# Patient Record
Sex: FEMALE | Race: WHITE | Employment: FULL TIME | URBAN - METROPOLITAN AREA
[De-identification: names, ages, dates, MRNs, and addresses within clinical notes are randomized per-mention and may not be internally consistent; named-entity substitution may affect disease eponyms.]

---

## 2022-09-23 PROBLEM — Z98.891 HISTORY OF CESAREAN DELIVERY: Status: ACTIVE | Noted: 2022-09-23

## 2022-09-23 PROBLEM — O09.529 AMA (ADVANCED MATERNAL AGE) MULTIGRAVIDA 35+: Status: ACTIVE | Noted: 2022-09-23

## 2022-09-23 PROBLEM — Z34.90 PREGNANCY: Status: ACTIVE | Noted: 2022-09-23

## 2022-09-23 PROBLEM — O09.90 HIGH-RISK PREGNANCY: Status: ACTIVE | Noted: 2022-09-23

## 2022-10-26 ENCOUNTER — ROUTINE PRENATAL (OUTPATIENT)
Dept: OBGYN CLINIC | Age: 37
End: 2022-10-26
Payer: COMMERCIAL

## 2022-10-26 VITALS
BODY MASS INDEX: 26.11 KG/M2 | WEIGHT: 147.38 LBS | DIASTOLIC BLOOD PRESSURE: 75 MMHG | HEIGHT: 63 IN | SYSTOLIC BLOOD PRESSURE: 120 MMHG

## 2022-10-26 DIAGNOSIS — Z11.51 SCREENING FOR HUMAN PAPILLOMAVIRUS: ICD-10-CM

## 2022-10-26 DIAGNOSIS — Z34.83 PRENATAL CARE, SUBSEQUENT PREGNANCY, THIRD TRIMESTER: ICD-10-CM

## 2022-10-26 DIAGNOSIS — O09.523 MULTIGRAVIDA OF ADVANCED MATERNAL AGE IN THIRD TRIMESTER: ICD-10-CM

## 2022-10-26 DIAGNOSIS — Z3A.32 32 WEEKS GESTATION OF PREGNANCY: Primary | ICD-10-CM

## 2022-10-26 DIAGNOSIS — O09.93 HIGH-RISK PREGNANCY IN THIRD TRIMESTER: ICD-10-CM

## 2022-10-26 DIAGNOSIS — Z11.3 SCREENING FOR VENEREAL DISEASE: ICD-10-CM

## 2022-10-26 PROCEDURE — 0502F SUBSEQUENT PRENATAL CARE: CPT | Performed by: OBSTETRICS & GYNECOLOGY

## 2022-10-26 NOTE — PROGRESS NOTES
Ketones:NEG // SG:NEG //  Monda Slates //  Pro:NEG //  Nit:NEG // Leuk: NEG   Pt presents with no new ob complaints//OB PAP TODAY, GBS NEXT//Aubrey

## 2022-10-31 LAB
C TRACH RRNA CVX QL NAA+PROBE: NEGATIVE
CYTOLOGIST CVX/VAG CYTO: NORMAL
CYTOLOGY CVX/VAG DOC CYTO: NORMAL
CYTOLOGY CVX/VAG DOC THIN PREP: NORMAL
DX ICD CODE: NORMAL
LABCORP, 190119: NORMAL
Lab: NORMAL
N GONORRHOEA RRNA CVX QL NAA+PROBE: NEGATIVE
OTHER STN SPEC: NORMAL
STAT OF ADQ CVX/VAG CYTO-IMP: NORMAL
T VAGINALIS RRNA SPEC QL NAA+PROBE: NEGATIVE

## 2022-11-09 ENCOUNTER — ROUTINE PRENATAL (OUTPATIENT)
Dept: OBGYN CLINIC | Age: 37
End: 2022-11-09
Payer: COMMERCIAL

## 2022-11-09 VITALS
HEIGHT: 63 IN | DIASTOLIC BLOOD PRESSURE: 68 MMHG | BODY MASS INDEX: 26.47 KG/M2 | WEIGHT: 149.38 LBS | SYSTOLIC BLOOD PRESSURE: 107 MMHG

## 2022-11-09 DIAGNOSIS — O09.523 MULTIGRAVIDA OF ADVANCED MATERNAL AGE IN THIRD TRIMESTER: ICD-10-CM

## 2022-11-09 DIAGNOSIS — Z3A.34 34 WEEKS GESTATION OF PREGNANCY: Primary | ICD-10-CM

## 2022-11-09 DIAGNOSIS — O09.93 HIGH-RISK PREGNANCY IN THIRD TRIMESTER: ICD-10-CM

## 2022-11-09 DIAGNOSIS — K59.00 CONSTIPATION, UNSPECIFIED CONSTIPATION TYPE: ICD-10-CM

## 2022-11-09 PROCEDURE — 0502F SUBSEQUENT PRENATAL CARE: CPT | Performed by: OBSTETRICS & GYNECOLOGY

## 2022-11-09 NOTE — PROGRESS NOTES
Ketones:NEG // SG:NEG //  Steven Hunt //  Pro:NEG //  Nit:NEG // Leuk: NEG   Pt presents with complaints of fatigue//GBS NEXT//Aubrey

## 2022-11-16 ENCOUNTER — ROUTINE PRENATAL (OUTPATIENT)
Dept: OBGYN CLINIC | Age: 37
End: 2022-11-16
Payer: COMMERCIAL

## 2022-11-16 VITALS
WEIGHT: 151 LBS | DIASTOLIC BLOOD PRESSURE: 78 MMHG | HEIGHT: 63 IN | SYSTOLIC BLOOD PRESSURE: 115 MMHG | BODY MASS INDEX: 26.75 KG/M2

## 2022-11-16 DIAGNOSIS — Z3A.35 35 WEEKS GESTATION OF PREGNANCY: ICD-10-CM

## 2022-11-16 DIAGNOSIS — O09.523 MULTIGRAVIDA OF ADVANCED MATERNAL AGE IN THIRD TRIMESTER: ICD-10-CM

## 2022-11-16 DIAGNOSIS — O09.93 HIGH-RISK PREGNANCY IN THIRD TRIMESTER: ICD-10-CM

## 2022-11-16 DIAGNOSIS — Z36.85 SCREENING, ANTENATAL, FOR STREPTOCOCCUS B: Primary | ICD-10-CM

## 2022-11-16 PROCEDURE — 0502F SUBSEQUENT PRENATAL CARE: CPT | Performed by: OBSTETRICS & GYNECOLOGY

## 2022-11-16 NOTE — PROGRESS NOTES
Ketones:NEG // SG:NEG //  Naveen Ledesma //  Pro:NEG //  Nit:NEG // Leuk: NEG   Pt presents with no new ob complaints//GBS TODAY//Aubrey

## 2022-11-20 LAB — B-HEM STREP SPEC QL CULT: NEGATIVE

## 2022-11-22 ENCOUNTER — ROUTINE PRENATAL (OUTPATIENT)
Dept: OBGYN CLINIC | Age: 37
End: 2022-11-22
Payer: COMMERCIAL

## 2022-11-22 VITALS
SYSTOLIC BLOOD PRESSURE: 135 MMHG | HEIGHT: 63 IN | WEIGHT: 151.5 LBS | BODY MASS INDEX: 26.84 KG/M2 | DIASTOLIC BLOOD PRESSURE: 69 MMHG

## 2022-11-22 DIAGNOSIS — O09.93 HIGH-RISK PREGNANCY IN THIRD TRIMESTER: ICD-10-CM

## 2022-11-22 DIAGNOSIS — Z3A.36 36 WEEKS GESTATION OF PREGNANCY: Primary | ICD-10-CM

## 2022-11-22 DIAGNOSIS — O09.523 MULTIGRAVIDA OF ADVANCED MATERNAL AGE IN THIRD TRIMESTER: ICD-10-CM

## 2022-11-22 PROCEDURE — 0502F SUBSEQUENT PRENATAL CARE: CPT | Performed by: OBSTETRICS & GYNECOLOGY

## 2022-11-22 PROCEDURE — 59025 FETAL NON-STRESS TEST: CPT | Performed by: OBSTETRICS & GYNECOLOGY

## 2022-11-22 NOTE — PROGRESS NOTES
Ketones:NEG // SG:NEG //  Zelphia Fall //  Pro:NEG //  Nit:NEG // Leuk: NEG   Pt presents with complaints of increased juan recinos x 2 days//GBS NEG//MKeeley

## 2022-11-30 ENCOUNTER — ROUTINE PRENATAL (OUTPATIENT)
Dept: OBGYN CLINIC | Age: 37
End: 2022-11-30
Payer: COMMERCIAL

## 2022-11-30 VITALS
SYSTOLIC BLOOD PRESSURE: 111 MMHG | HEIGHT: 63 IN | DIASTOLIC BLOOD PRESSURE: 65 MMHG | BODY MASS INDEX: 27.51 KG/M2 | WEIGHT: 155.25 LBS

## 2022-11-30 DIAGNOSIS — O09.523 MULTIGRAVIDA OF ADVANCED MATERNAL AGE IN THIRD TRIMESTER: ICD-10-CM

## 2022-11-30 DIAGNOSIS — Z3A.37 37 WEEKS GESTATION OF PREGNANCY: Primary | ICD-10-CM

## 2022-11-30 DIAGNOSIS — O09.93 HIGH-RISK PREGNANCY IN THIRD TRIMESTER: ICD-10-CM

## 2022-11-30 PROCEDURE — 0502F SUBSEQUENT PRENATAL CARE: CPT | Performed by: OBSTETRICS & GYNECOLOGY

## 2022-11-30 PROCEDURE — 59025 FETAL NON-STRESS TEST: CPT | Performed by: OBSTETRICS & GYNECOLOGY

## 2022-11-30 NOTE — PROGRESS NOTES
Ketones:NEG // SG:NEG //  Maritza Streeter //  Pro:NEG //  Nit:NEG // Leuk: NEG   Pt presents with complaints of juan recinos contractions x 1 day//GBS NEG. Johny Kearns

## 2022-12-07 ENCOUNTER — ANESTHESIA EVENT (OUTPATIENT)
Dept: LABOR AND DELIVERY | Age: 37
End: 2022-12-07
Payer: COMMERCIAL

## 2022-12-07 ENCOUNTER — ANESTHESIA (OUTPATIENT)
Dept: LABOR AND DELIVERY | Age: 37
End: 2022-12-07
Payer: COMMERCIAL

## 2022-12-07 ENCOUNTER — HOSPITAL ENCOUNTER (INPATIENT)
Age: 37
LOS: 2 days | Discharge: HOME | End: 2022-12-09
Attending: OBSTETRICS & GYNECOLOGY | Admitting: OBSTETRICS & GYNECOLOGY
Payer: COMMERCIAL

## 2022-12-07 DIAGNOSIS — Z98.891 STATUS POST REPEAT LOW TRANSVERSE CESAREAN SECTION: Primary | ICD-10-CM

## 2022-12-07 PROBLEM — O09.529 AMA (ADVANCED MATERNAL AGE) MULTIGRAVIDA 35+: Status: RESOLVED | Noted: 2022-09-23 | Resolved: 2022-12-07

## 2022-12-07 PROBLEM — Z3A.38 38 WEEKS GESTATION OF PREGNANCY: Status: ACTIVE | Noted: 2022-12-07

## 2022-12-07 PROBLEM — O47.9 UTERINE CONTRACTIONS: Status: ACTIVE | Noted: 2022-12-07

## 2022-12-07 PROBLEM — O09.90 HIGH-RISK PREGNANCY: Status: RESOLVED | Noted: 2022-09-23 | Resolved: 2022-12-07

## 2022-12-07 PROBLEM — Z34.90 PREGNANCY: Status: RESOLVED | Noted: 2022-09-23 | Resolved: 2022-12-07

## 2022-12-07 PROBLEM — Z3A.38 38 WEEKS GESTATION OF PREGNANCY: Status: RESOLVED | Noted: 2022-12-07 | Resolved: 2022-12-07

## 2022-12-07 PROBLEM — R10.9 ABDOMINAL PAIN: Status: ACTIVE | Noted: 2022-12-07

## 2022-12-07 PROBLEM — O47.9 UTERINE CONTRACTIONS: Status: RESOLVED | Noted: 2022-12-07 | Resolved: 2022-12-07

## 2022-12-07 LAB
ABO + RH BLD: NORMAL
AMPHET UR QL SCN: NEGATIVE
APPEARANCE UR: CLEAR
BACTERIA URNS QL MICRO: NEGATIVE /HPF
BARBITURATES UR QL SCN: NEGATIVE
BASOPHILS # BLD: 0 K/UL (ref 0–0.1)
BASOPHILS NFR BLD: 0 % (ref 0–1)
BENZODIAZ UR QL: NEGATIVE
BILIRUB UR QL: NEGATIVE
BLOOD GROUP ANTIBODIES SERPL: NEGATIVE
CANNABINOIDS UR QL SCN: NEGATIVE
COCAINE UR QL SCN: NEGATIVE
COLOR UR: ABNORMAL
DIFFERENTIAL METHOD BLD: ABNORMAL
DRUG SCRN COMMENT,DRGCM: NORMAL
EOSINOPHIL # BLD: 0 K/UL (ref 0–0.4)
EOSINOPHIL NFR BLD: 0 % (ref 0–7)
ERYTHROCYTE [DISTWIDTH] IN BLOOD BY AUTOMATED COUNT: 13.2 % (ref 11.5–14.5)
GLUCOSE UR STRIP.AUTO-MCNC: NEGATIVE MG/DL
HCT VFR BLD AUTO: 32.1 % (ref 35–47)
HGB BLD-MCNC: 10.5 G/DL (ref 11.5–16)
HGB UR QL STRIP: ABNORMAL
IMM GRANULOCYTES # BLD AUTO: 0.1 K/UL (ref 0–0.04)
IMM GRANULOCYTES NFR BLD AUTO: 1 % (ref 0–0.5)
KETONES UR QL STRIP.AUTO: NEGATIVE MG/DL
LEUKOCYTE ESTERASE UR QL STRIP.AUTO: NEGATIVE
LYMPHOCYTES # BLD: 1.5 K/UL (ref 0.8–3.5)
LYMPHOCYTES NFR BLD: 14 % (ref 12–49)
MCH RBC QN AUTO: 27.7 PG (ref 26–34)
MCHC RBC AUTO-ENTMCNC: 32.7 G/DL (ref 30–36.5)
MCV RBC AUTO: 84.7 FL (ref 80–99)
METHADONE UR QL: NEGATIVE
MONOCYTES # BLD: 0.6 K/UL (ref 0–1)
MONOCYTES NFR BLD: 5 % (ref 5–13)
MUCOUS THREADS URNS QL MICRO: ABNORMAL /LPF
NEUTS SEG # BLD: 8.4 K/UL (ref 1.8–8)
NEUTS SEG NFR BLD: 80 % (ref 32–75)
NITRITE UR QL STRIP.AUTO: NEGATIVE
NRBC # BLD: 0 K/UL (ref 0–0.01)
NRBC BLD-RTO: 0 PER 100 WBC
OPIATES UR QL: NEGATIVE
PCP UR QL: NEGATIVE
PH UR STRIP: 6 [PH]
PLATELET # BLD AUTO: 242 K/UL (ref 150–400)
PMV BLD AUTO: 10.1 FL (ref 8.9–12.9)
PROT UR STRIP-MCNC: NEGATIVE MG/DL
RBC # BLD AUTO: 3.79 M/UL (ref 3.8–5.2)
RBC #/AREA URNS HPF: ABNORMAL /HPF (ref 0–5)
SP GR UR REFRACTOMETRY: 1.01 (ref 1–1.03)
SPECIMEN EXP DATE BLD: NORMAL
UROBILINOGEN UR QL STRIP.AUTO: 0.1 EU/DL (ref 0.2–1)
WBC # BLD AUTO: 10.5 K/UL (ref 3.6–11)
WBC URNS QL MICRO: ABNORMAL /HPF (ref 0–4)

## 2022-12-07 PROCEDURE — 76060000033 HC ANESTHESIA 1 TO 1.5 HR: Performed by: OBSTETRICS & GYNECOLOGY

## 2022-12-07 PROCEDURE — 0UB50ZZ EXCISION OF RIGHT FALLOPIAN TUBE, OPEN APPROACH: ICD-10-PCS | Performed by: OBSTETRICS & GYNECOLOGY

## 2022-12-07 PROCEDURE — 86900 BLOOD TYPING SEROLOGIC ABO: CPT

## 2022-12-07 PROCEDURE — 77030007866 HC KT SPN ANES BBMI -B: Performed by: ANESTHESIOLOGY

## 2022-12-07 PROCEDURE — 75410000002 HC LABOR FEE PER 1 HR

## 2022-12-07 PROCEDURE — 99284 EMERGENCY DEPT VISIT MOD MDM: CPT

## 2022-12-07 PROCEDURE — 85025 COMPLETE CBC W/AUTO DIFF WBC: CPT

## 2022-12-07 PROCEDURE — 76010000392 HC C SECN EA ADDL 0.5 HR: Performed by: OBSTETRICS & GYNECOLOGY

## 2022-12-07 PROCEDURE — 88305 TISSUE EXAM BY PATHOLOGIST: CPT

## 2022-12-07 PROCEDURE — 76060000078 HC EPIDURAL ANESTHESIA: Performed by: OBSTETRICS & GYNECOLOGY

## 2022-12-07 PROCEDURE — 81001 URINALYSIS AUTO W/SCOPE: CPT

## 2022-12-07 PROCEDURE — 74011250636 HC RX REV CODE- 250/636: Performed by: NURSE ANESTHETIST, CERTIFIED REGISTERED

## 2022-12-07 PROCEDURE — 99285 EMERGENCY DEPT VISIT HI MDM: CPT

## 2022-12-07 PROCEDURE — 74011250636 HC RX REV CODE- 250/636: Performed by: OBSTETRICS & GYNECOLOGY

## 2022-12-07 PROCEDURE — 74011000258 HC RX REV CODE- 258: Performed by: OBSTETRICS & GYNECOLOGY

## 2022-12-07 PROCEDURE — 74011000258 HC RX REV CODE- 258: Performed by: NURSE ANESTHETIST, CERTIFIED REGISTERED

## 2022-12-07 PROCEDURE — 80307 DRUG TEST PRSMV CHEM ANLYZR: CPT

## 2022-12-07 PROCEDURE — 74011000250 HC RX REV CODE- 250: Performed by: NURSE ANESTHETIST, CERTIFIED REGISTERED

## 2022-12-07 PROCEDURE — 76010000391 HC C SECN FIRST 1 HR: Performed by: OBSTETRICS & GYNECOLOGY

## 2022-12-07 PROCEDURE — 74011250636 HC RX REV CODE- 250/636

## 2022-12-07 PROCEDURE — 74011250637 HC RX REV CODE- 250/637: Performed by: OBSTETRICS & GYNECOLOGY

## 2022-12-07 PROCEDURE — 75410000003 HC RECOV DEL/VAG/CSECN EA 0.5 HR

## 2022-12-07 PROCEDURE — 36415 COLL VENOUS BLD VENIPUNCTURE: CPT

## 2022-12-07 PROCEDURE — 65410000002 HC RM PRIVATE OB

## 2022-12-07 RX ORDER — OXYTOCIN/RINGER'S LACTATE 30/500 ML
10 PLASTIC BAG, INJECTION (ML) INTRAVENOUS AS NEEDED
Status: DISCONTINUED | OUTPATIENT
Start: 2022-12-07 | End: 2022-12-07

## 2022-12-07 RX ORDER — IBUPROFEN 800 MG/1
800 TABLET ORAL EVERY 8 HOURS
Status: DISCONTINUED | OUTPATIENT
Start: 2022-12-07 | End: 2022-12-09 | Stop reason: HOSPADM

## 2022-12-07 RX ORDER — DOCUSATE SODIUM 100 MG/1
100 CAPSULE, LIQUID FILLED ORAL
Status: DISCONTINUED | OUTPATIENT
Start: 2022-12-07 | End: 2022-12-09 | Stop reason: HOSPADM

## 2022-12-07 RX ORDER — SODIUM CHLORIDE, SODIUM LACTATE, POTASSIUM CHLORIDE, CALCIUM CHLORIDE 600; 310; 30; 20 MG/100ML; MG/100ML; MG/100ML; MG/100ML
1000 INJECTION, SOLUTION INTRAVENOUS CONTINUOUS
Status: DISCONTINUED | OUTPATIENT
Start: 2022-12-07 | End: 2022-12-07

## 2022-12-07 RX ORDER — IBUPROFEN 800 MG/1
800 TABLET ORAL EVERY 8 HOURS
Status: DISCONTINUED | OUTPATIENT
Start: 2022-12-07 | End: 2022-12-07

## 2022-12-07 RX ORDER — DIPHENHYDRAMINE HYDROCHLORIDE 50 MG/ML
25 INJECTION, SOLUTION INTRAMUSCULAR; INTRAVENOUS
Status: DISCONTINUED | OUTPATIENT
Start: 2022-12-07 | End: 2022-12-09 | Stop reason: HOSPADM

## 2022-12-07 RX ORDER — BUPIVACAINE HYDROCHLORIDE 7.5 MG/ML
INJECTION, SOLUTION INTRASPINAL
Status: SHIPPED | OUTPATIENT
Start: 2022-12-07 | End: 2022-12-07

## 2022-12-07 RX ORDER — MORPHINE SULFATE 0.5 MG/ML
INJECTION, SOLUTION EPIDURAL; INTRATHECAL; INTRAVENOUS
Status: SHIPPED | OUTPATIENT
Start: 2022-12-07 | End: 2022-12-07

## 2022-12-07 RX ORDER — SODIUM CHLORIDE 0.9 % (FLUSH) 0.9 %
5-40 SYRINGE (ML) INJECTION AS NEEDED
Status: DISCONTINUED | OUTPATIENT
Start: 2022-12-07 | End: 2022-12-09 | Stop reason: HOSPADM

## 2022-12-07 RX ORDER — BUTORPHANOL TARTRATE 1 MG/ML
1 INJECTION INTRAMUSCULAR; INTRAVENOUS
Status: COMPLETED | OUTPATIENT
Start: 2022-12-07 | End: 2022-12-07

## 2022-12-07 RX ORDER — OXYCODONE AND ACETAMINOPHEN 5; 325 MG/1; MG/1
2 TABLET ORAL
Status: DISCONTINUED | OUTPATIENT
Start: 2022-12-07 | End: 2022-12-09 | Stop reason: HOSPADM

## 2022-12-07 RX ORDER — OXYCODONE AND ACETAMINOPHEN 5; 325 MG/1; MG/1
1 TABLET ORAL
Status: DISCONTINUED | OUTPATIENT
Start: 2022-12-07 | End: 2022-12-09 | Stop reason: HOSPADM

## 2022-12-07 RX ORDER — OXYTOCIN/RINGER'S LACTATE 30/500 ML
87.3 PLASTIC BAG, INJECTION (ML) INTRAVENOUS AS NEEDED
Status: DISCONTINUED | OUTPATIENT
Start: 2022-12-07 | End: 2022-12-07

## 2022-12-07 RX ORDER — SIMETHICONE 80 MG
80 TABLET,CHEWABLE ORAL AS NEEDED
Status: DISCONTINUED | OUTPATIENT
Start: 2022-12-07 | End: 2022-12-09 | Stop reason: HOSPADM

## 2022-12-07 RX ORDER — SODIUM CHLORIDE 9 MG/ML
INJECTION, SOLUTION INTRAVENOUS
Status: DISCONTINUED | OUTPATIENT
Start: 2022-12-07 | End: 2022-12-07 | Stop reason: HOSPADM

## 2022-12-07 RX ORDER — KETOROLAC TROMETHAMINE 30 MG/ML
INJECTION, SOLUTION INTRAMUSCULAR; INTRAVENOUS AS NEEDED
Status: DISCONTINUED | OUTPATIENT
Start: 2022-12-07 | End: 2022-12-07 | Stop reason: HOSPADM

## 2022-12-07 RX ORDER — SODIUM CHLORIDE, SODIUM LACTATE, POTASSIUM CHLORIDE, CALCIUM CHLORIDE 600; 310; 30; 20 MG/100ML; MG/100ML; MG/100ML; MG/100ML
125 INJECTION, SOLUTION INTRAVENOUS CONTINUOUS
Status: DISCONTINUED | OUTPATIENT
Start: 2022-12-07 | End: 2022-12-09 | Stop reason: HOSPADM

## 2022-12-07 RX ORDER — FAMOTIDINE 10 MG/ML
INJECTION INTRAVENOUS AS NEEDED
Status: DISCONTINUED | OUTPATIENT
Start: 2022-12-07 | End: 2022-12-07 | Stop reason: HOSPADM

## 2022-12-07 RX ORDER — OXYTOCIN 10 [USP'U]/ML
INJECTION, SOLUTION INTRAMUSCULAR; INTRAVENOUS AS NEEDED
Status: DISCONTINUED | OUTPATIENT
Start: 2022-12-07 | End: 2022-12-07 | Stop reason: HOSPADM

## 2022-12-07 RX ORDER — DOCUSATE SODIUM 100 MG/1
100 CAPSULE, LIQUID FILLED ORAL
Qty: 60 CAPSULE | Refills: 0 | Status: SHIPPED | OUTPATIENT
Start: 2022-12-07

## 2022-12-07 RX ORDER — OXYCODONE AND ACETAMINOPHEN 5; 325 MG/1; MG/1
1 TABLET ORAL
Qty: 30 TABLET | Refills: 0 | Status: SHIPPED | OUTPATIENT
Start: 2022-12-07 | End: 2022-12-12

## 2022-12-07 RX ORDER — SODIUM CHLORIDE 0.9 % (FLUSH) 0.9 %
5-40 SYRINGE (ML) INJECTION EVERY 8 HOURS
Status: DISCONTINUED | OUTPATIENT
Start: 2022-12-07 | End: 2022-12-07

## 2022-12-07 RX ORDER — NALOXONE HYDROCHLORIDE 0.4 MG/ML
0.4 INJECTION, SOLUTION INTRAMUSCULAR; INTRAVENOUS; SUBCUTANEOUS AS NEEDED
Status: DISCONTINUED | OUTPATIENT
Start: 2022-12-07 | End: 2022-12-09 | Stop reason: HOSPADM

## 2022-12-07 RX ORDER — SODIUM CHLORIDE 0.9 % (FLUSH) 0.9 %
5-40 SYRINGE (ML) INJECTION EVERY 8 HOURS
Status: DISCONTINUED | OUTPATIENT
Start: 2022-12-07 | End: 2022-12-09 | Stop reason: HOSPADM

## 2022-12-07 RX ORDER — SODIUM CHLORIDE, SODIUM LACTATE, POTASSIUM CHLORIDE, CALCIUM CHLORIDE 600; 310; 30; 20 MG/100ML; MG/100ML; MG/100ML; MG/100ML
INJECTION, SOLUTION INTRAVENOUS
Status: DISCONTINUED | OUTPATIENT
Start: 2022-12-07 | End: 2022-12-07 | Stop reason: HOSPADM

## 2022-12-07 RX ORDER — IBUPROFEN 800 MG/1
800 TABLET ORAL
Qty: 60 TABLET | Refills: 0 | Status: SHIPPED | OUTPATIENT
Start: 2022-12-07

## 2022-12-07 RX ORDER — OXYTOCIN/RINGER'S LACTATE 30/500 ML
PLASTIC BAG, INJECTION (ML) INTRAVENOUS
Status: DISCONTINUED
Start: 2022-12-07 | End: 2022-12-07

## 2022-12-07 RX ORDER — ONDANSETRON 2 MG/ML
INJECTION INTRAMUSCULAR; INTRAVENOUS AS NEEDED
Status: DISCONTINUED | OUTPATIENT
Start: 2022-12-07 | End: 2022-12-07 | Stop reason: HOSPADM

## 2022-12-07 RX ORDER — ONDANSETRON 2 MG/ML
4 INJECTION INTRAMUSCULAR; INTRAVENOUS
Status: DISCONTINUED | OUTPATIENT
Start: 2022-12-07 | End: 2022-12-09 | Stop reason: HOSPADM

## 2022-12-07 RX ORDER — CLINDAMYCIN PHOSPHATE 900 MG/50ML
900 INJECTION, SOLUTION INTRAVENOUS
Status: COMPLETED | OUTPATIENT
Start: 2022-12-07 | End: 2022-12-07

## 2022-12-07 RX ORDER — OXYTOCIN/RINGER'S LACTATE 30/500 ML
10 PLASTIC BAG, INJECTION (ML) INTRAVENOUS AS NEEDED
Status: DISCONTINUED | OUTPATIENT
Start: 2022-12-07 | End: 2022-12-09 | Stop reason: HOSPADM

## 2022-12-07 RX ORDER — ZOLPIDEM TARTRATE 5 MG/1
5 TABLET ORAL
Status: DISCONTINUED | OUTPATIENT
Start: 2022-12-07 | End: 2022-12-09 | Stop reason: HOSPADM

## 2022-12-07 RX ORDER — SODIUM CHLORIDE 0.9 % (FLUSH) 0.9 %
5-40 SYRINGE (ML) INJECTION AS NEEDED
Status: DISCONTINUED | OUTPATIENT
Start: 2022-12-07 | End: 2022-12-07

## 2022-12-07 RX ORDER — BUTORPHANOL TARTRATE 1 MG/ML
INJECTION INTRAMUSCULAR; INTRAVENOUS
Status: COMPLETED
Start: 2022-12-07 | End: 2022-12-07

## 2022-12-07 RX ORDER — OXYTOCIN/RINGER'S LACTATE 30/500 ML
87.3 PLASTIC BAG, INJECTION (ML) INTRAVENOUS AS NEEDED
Status: DISCONTINUED | OUTPATIENT
Start: 2022-12-07 | End: 2022-12-09 | Stop reason: HOSPADM

## 2022-12-07 RX ORDER — DEXAMETHASONE SODIUM PHOSPHATE 4 MG/ML
INJECTION, SOLUTION INTRA-ARTICULAR; INTRALESIONAL; INTRAMUSCULAR; INTRAVENOUS; SOFT TISSUE AS NEEDED
Status: DISCONTINUED | OUTPATIENT
Start: 2022-12-07 | End: 2022-12-07 | Stop reason: HOSPADM

## 2022-12-07 RX ADMIN — MORPHINE SULFATE 0.15 MG: 0.5 INJECTION, SOLUTION EPIDURAL; INTRATHECAL; INTRAVENOUS at 08:07

## 2022-12-07 RX ADMIN — PHENYLEPHRINE HYDROCHLORIDE 100 MCG/MIN: 10 INJECTION INTRAVENOUS at 07:58

## 2022-12-07 RX ADMIN — SODIUM CHLORIDE, POTASSIUM CHLORIDE, SODIUM LACTATE AND CALCIUM CHLORIDE 75 ML/HR: 600; 310; 30; 20 INJECTION, SOLUTION INTRAVENOUS at 11:28

## 2022-12-07 RX ADMIN — GENTAMICIN SULFATE 262 MG: 40 INJECTION, SOLUTION INTRAMUSCULAR; INTRAVENOUS at 08:00

## 2022-12-07 RX ADMIN — OXYTOCIN 30 UNITS: 10 INJECTION, SOLUTION INTRAMUSCULAR; INTRAVENOUS at 08:30

## 2022-12-07 RX ADMIN — BUTORPHANOL TARTRATE 1 MG: 1 INJECTION, SOLUTION INTRAMUSCULAR; INTRAVENOUS at 05:41

## 2022-12-07 RX ADMIN — IBUPROFEN 800 MG: 800 TABLET, FILM COATED ORAL at 22:48

## 2022-12-07 RX ADMIN — SODIUM CHLORIDE, POTASSIUM CHLORIDE, SODIUM LACTATE AND CALCIUM CHLORIDE 1000 ML: 600; 310; 30; 20 INJECTION, SOLUTION INTRAVENOUS at 06:32

## 2022-12-07 RX ADMIN — CLINDAMYCIN PHOSPHATE 900 MG: 900 INJECTION, SOLUTION INTRAVENOUS at 08:23

## 2022-12-07 RX ADMIN — SODIUM CHLORIDE: 9 INJECTION, SOLUTION INTRAVENOUS at 08:30

## 2022-12-07 RX ADMIN — KETOROLAC TROMETHAMINE 30 MG: 30 INJECTION, SOLUTION INTRAMUSCULAR at 08:47

## 2022-12-07 RX ADMIN — IBUPROFEN 800 MG: 800 TABLET, FILM COATED ORAL at 15:32

## 2022-12-07 RX ADMIN — BUTORPHANOL TARTRATE 1 MG: 1 INJECTION, SOLUTION INTRAMUSCULAR; INTRAVENOUS at 07:12

## 2022-12-07 RX ADMIN — DEXAMETHASONE SODIUM PHOSPHATE 4 MG: 4 INJECTION, SOLUTION INTRA-ARTICULAR; INTRALESIONAL; INTRAMUSCULAR; INTRAVENOUS; SOFT TISSUE at 07:40

## 2022-12-07 RX ADMIN — SODIUM CHLORIDE, POTASSIUM CHLORIDE, SODIUM LACTATE AND CALCIUM CHLORIDE: 600; 310; 30; 20 INJECTION, SOLUTION INTRAVENOUS at 07:45

## 2022-12-07 RX ADMIN — BUTORPHANOL TARTRATE 1 MG: 1 INJECTION INTRAMUSCULAR; INTRAVENOUS at 07:12

## 2022-12-07 RX ADMIN — ONDANSETRON 4 MG: 2 INJECTION INTRAMUSCULAR; INTRAVENOUS at 07:40

## 2022-12-07 RX ADMIN — FAMOTIDINE 20 MG: 10 INJECTION INTRAVENOUS at 07:40

## 2022-12-07 RX ADMIN — BUPIVACAINE HYDROCHLORIDE 13 MG: 7.5 INJECTION, SOLUTION INTRASPINAL at 08:07

## 2022-12-07 RX ADMIN — SODIUM CHLORIDE, POTASSIUM CHLORIDE, SODIUM LACTATE AND CALCIUM CHLORIDE 1000 ML: 600; 310; 30; 20 INJECTION, SOLUTION INTRAVENOUS at 05:30

## 2022-12-07 NOTE — H&P
History & Physical    Name: Mindi Cormier MRN: [id number]  SSN: [id number]    YOB: 1985  Age: 40 y.o. Sex: female        Estimated Date of Delivery: 22  OB History    Para Term  AB Living   2 1 1     1   SAB IAB Ectopic Molar Multiple Live Births                    # Outcome Date GA Lbr Dallin/2nd Weight Sex Delivery Anes PTL Lv   2 Current            1 Term 18   8 lb 6 oz (3.799 kg) F CS-Unspec          Chief Complaint   Patient presents with    Abdominal Pain     Back pain    Contractions         Ms. Ada Wall is a 15-year-old -0-0-1 at 38 weeks and 5 days, ROSA ISELA 2022, patient of Dr. Jelani Lanier, history of  section x1, who presents to labor and delivery with complaint of uterine contractions. Onset approximately 6 PM last night. Increased in frequency now. Denies leaking of fluid. Last week its been and noticed blood-tinged. Positive fetal movement. Patient has history of  section x1 for prolonged pushing and stage III where she pushed for 4 hours. Patient states baby was \"froilan side up\". She ended up with a primary  section. During this pregnancy she expressed desire for TOLAC. And wanted to see if she could go in to labor on her own. She was told that her pelvis seemed too narrow and likely she would have a repeat  section. They were going to conclude final mode of delivery at this week's upcoming visit. She saw M briefly for AMA and was cleared after anatomy scan per pt. Records not in system. Transferred care at [facility]wga from [facility].        Past Medical History:   Diagnosis Date    Abnormal Papanicolaou smear of cervix 4/15/2018    Found out when pregnant with first child    Hyperglycemia     with pregnancy, negative for diabetes    Migraines     Wears glasses      Past Surgical History:   Procedure Laterality Date    HX BREAST AUGMENTATION      HX  SECTION  2018    HX LEEP PROCEDURE  2019    HX WISDOM TEETH EXTRACTION       Social History     Occupational History    Not on file   Tobacco Use    Smoking status: Never    Smokeless tobacco: Never   Vaping Use    Vaping Use: Never used   Substance and Sexual Activity    Alcohol use: Not Currently     Comment: occ/social    Drug use: Never    Sexual activity: Yes     Partners: Male     Birth control/protection: None     Family History   Problem Relation Age of Onset    Diabetes Mother     Elevated Lipids Father     Colon Cancer Maternal Aunt     Prostate Cancer Maternal Grandfather     Colon Cancer Paternal Grandfather     No Known Problems Brother     Other Maternal Grandmother         unknown    Heart Attack Paternal Grandmother     High Cholesterol Paternal Grandmother        Allergies   Allergen Reactions    Amoxicillin Hives    Penicillins Hives    Sulfa (Sulfonamide Antibiotics) Hives     Prior to Admission medications    Medication Sig Start Date End Date Taking? Authorizing Provider   prenatal vit-iron fumarate-fa 27 mg iron- 0.8 mg tab tablet Take 1 Tablet by mouth daily. Yes Provider, Historical   linaCLOtide (Linzess) 145 mcg cap capsule Take 1 Capsule by mouth Daily (before breakfast). Patient not taking: Reported on 12/7/2022 11/10/22   Maximino Call MD        Review of Systems   Constitutional:  Positive for chills. Negative for fever. Eyes:  Negative for pain. Respiratory:  Negative for cough and shortness of breath. Cardiovascular:  Negative for chest pain. Gastrointestinal:  Negative for nausea and vomiting. Genitourinary:  Negative for dysuria. Musculoskeletal:  Negative for myalgias. Neurological:  Negative for dizziness and headaches. Hematological:  Does not bruise/bleed easily. Psychiatric/Behavioral:  Negative for behavioral problems, dysphoric mood and suicidal ideas. The patient is nervous/anxious.       Objective:     Vitals:  Vitals:    12/07/22 0502   Resp: 20   Temp: 98.8 °F (37.1 °C)   SpO2: 100%   Weight: 155 lb 4 oz (70.4 kg)   Height: 5' 3\" (1.6 m)        Physical Exam  Vitals reviewed. Constitutional:       General: She is not in acute distress (uncomfortable with contractions). Appearance: Normal appearance. She is not ill-appearing. HENT:      Head: Normocephalic and atraumatic. Eyes:      Extraocular Movements: Extraocular movements intact. Pulmonary:      Effort: Pulmonary effort is normal.   Abdominal:      Comments: Gravid, NT   Musculoskeletal:         General: Normal range of motion. Cervical back: Normal range of motion. Skin:     General: Skin is warm and dry. Neurological:      Mental Status: She is alert and oriented to person, place, and time. Psychiatric:         Mood and Affect: Mood normal.         Behavior: Behavior normal.         FHT: 145/mod/+acc/no decels  [place]: ctxs q 5-10 mins  SVE:4cm/90%/-2 per nursing   Cephalic by bedside ultrasound. Prenatal Labs:    Lab Results   Component Value Date/Time    Rubella, External 3.66 2022 12:00 AM    HIV, External NEGATIVE 2022 12:00 AM    Gonorrhea, External NEGATIVE 2022 12:00 AM    Chlamydia, External NEGATIVE 2022 12:00 AM    ABO,Rh A POS 2022 12:00 AM          Impression/Plan:     Principal Problem:    Uterine contractions (2022)    Active Problems:    History of  section (2022)      38 weeks gestation of pregnancy (2022)         Plan: Patient in labor with history of  section x 1 for arrest of descent.      Reviewed risks of  section to include but not be limited to risks of fetal heart rate abnormalities requiring emergency  section and its associated risks including risk of damage to uterus and surrounding organs including bowel, bladder, nerves, blood vessels, risk of bleeding, need for blood transfusion and its associated risks, infection, reoperation, prolonged hospital stay, or shoulder dystocia with possible sequelae of permanent nerve damage. Also reviewed risks of operative delivery to include but not be limited to risk of injury to the baby's face or head, or subdural hemorrhage, hematoma. Per trial of labor after  section, reviewed risks to include but not be limited to risks of uterine rupture less than 1% in setting of h/o  x 1, failed TOLAC, emergent  section, fetal and maternal morbidity or mortality. I recommend proceeding with RCS as patient had prolonged pushing (arrest of descent) that ultimately led to a  section. She agrees and desires to proceed with RCS. Reviewed risks of circumcision to include but not limited to risk of injury to penis, bleeding, infection, taking off too much or too little foreskin. Anesthesia notified. Group B Strep negative. Reconfirmed patient desires RCS.

## 2022-12-07 NOTE — ANESTHESIA PREPROCEDURE EVALUATION
Relevant Problems   No relevant active problems       Anesthetic History   No history of anesthetic complications            Review of Systems / Medical History  Patient summary reviewed, nursing notes reviewed and pertinent labs reviewed    Pulmonary  Within defined limits                 Neuro/Psych         Headaches     Cardiovascular  Within defined limits                     GI/Hepatic/Renal  Within defined limits              Endo/Other  Within defined limits           Other Findings   Comments:   Healthy 40year old female with p/h/o prior C/S who presents in term labor. Procedure Information    Case: [id number] Date: 22  Procedure:  SECTION (Abdomen)  Pre-op diagnosis: Prior Uterine Surgery  Location: [facility] L&D OR  Surgeons: Solo Zamorano MD      Medical History  Hyperglycemia  Wears glasses  Migraines  Abnormal Papanicolaou smear of cervix           Physical Exam    Airway  Mallampati: II  TM Distance: 4 - 6 cm  Neck ROM: normal range of motion   Mouth opening: Normal     Cardiovascular    Rhythm: regular  Rate: normal         Dental  No notable dental hx       Pulmonary  Breath sounds clear to auscultation               Abdominal  GI exam deferred       Other Findings            Anesthetic Plan    ASA: 2  Anesthesia type: spinal, epidural and general - backup    Monitoring Plan: Continuous noninvasive hemodynamic monitoring        Anesthetic plan and risks discussed with: Patient and Spouse      Benefits and risks of spinal anesthesia discussed with patient/family. All questions answered.

## 2022-12-07 NOTE — ANESTHESIA PROCEDURE NOTES
Spinal Block    Start time: 12/7/2022 12:02 AM  Performed by: Saeed Tao CRNA  Authorized by: Saeed Tao CRNA     Pre-procedure:   Indications: primary anesthetic  Preanesthetic Checklist: patient identified, risks and benefits discussed, anesthesia consent, patient being monitored, timeout performed and fire risk safety assessment completed and verbalized    Timeout Time: 08:02 EST      Spinal Block:   Patient Position:  Seated    Prep: Betadine      Location:  L3-4  Technique:  Single shot  Local: morphine (PF) (DURAMORPH;ASTRAMORPH) 0.5 mg/mL injection - Intrathecal   0.15 mg - 12/7/2022 8:07:00 AM  bupivacaine 0.75% in dextrose 8.25% preserv-free (SENSORCAINE) Intrathecal - Intrathecal   13 mg - 12/7/2022 8:07:00 AM  Local Dose (mL):  1.6  Med Admin Time: 12/7/2022 8:07 AM    Needle:   Needle Type:  Pencan  Needle Gauge:  25 G  Attempts:  1      Events: CSF confirmed, no blood with aspiration and no paresthesia        Assessment:  Insertion:  Uncomplicated  Patient tolerance:  Patient tolerated the procedure well with no immediate complications

## 2022-12-07 NOTE — PROGRESS NOTES
5107  Pt arrived ambulatory to L&D with c/o contractions. Pt arrived with spouse; escorted to [facility] and to  to void and change into [facility]. 8299  Dr. Armando Jackson notified and given update on pt status (SVE; contractions; FHT's; h/o prior C/S; allergies). Orders received. Dr. Armando Jackson states she will come see pt and enter admission orders. 8400  Dr. Armando Jcakson present to assess pt and explain plan of care. 2749  Dr. Armando Jackson performed BS US to confirm fetal presentation. Vertex presentation confirmed. 0483  Dr. Alanna Shultz present to assess pt and go over anesthesia plan of care. Dr. Alanna Shultz made aware of platelet count. 5072  BS report given to Checo Dooley RN.

## 2022-12-07 NOTE — ANESTHESIA POSTPROCEDURE EVALUATION
Procedure(s):   SECTION. spinal, epidural, general - backup    Anesthesia Post Evaluation      Multimodal analgesia: multimodal analgesia used between 6 hours prior to anesthesia start to PACU discharge  Patient location during evaluation: floor (Labor and Delivery Unit)  Patient participation: complete - patient participated  Level of consciousness: awake  Pain score: 0  Pain management: adequate  Airway patency: patent  Anesthetic complications: no  Cardiovascular status: acceptable  Respiratory status: acceptable  Hydration status: acceptable  Comments: The patient was transferred from the obstetric operating room to the L&D patient room. The patient was hemodynamically stable. Handoff was given to the nursing staff. All pre-, intra-, and postoperative questions were answered.    Post anesthesia nausea and vomiting:  none  Final Post Anesthesia Temperature Assessment:  Normothermia (36.0-37.5 degrees C)      INITIAL Post-op Vital signs:   Vitals Value Taken Time   /62 22   Temp 36.7 °C (98.1 °F) 22   Pulse 72 22   Resp 15 22   SpO2 100 % 22

## 2022-12-07 NOTE — PROGRESS NOTES
Problem: Patient Education: Go to Patient Education Activity  Goal: Patient/Family Education  2022 1239 by Shakira Ybarra RN  Outcome: Progressing Towards Goal  2022 1147 by Shakira Ybarra RN  Outcome: Progressing Towards Goal     Problem:  Delivery: Day of Delivery  Goal: Off Pathway (Use only if patient is Off Pathway)  2022 1239 by Shakira Ybarra RN  Outcome: Progressing Towards Goal  2022 1147 by Shakira Ybarra RN  Outcome: Progressing Towards Goal  Goal: Activity/Safety  2022 1239 by Shakira Ybarra RN  Outcome: Progressing Towards Goal  2022 1147 by Shakira Ybarra RN  Outcome: Progressing Towards Goal  Goal: Consults, if ordered  2022 1239 by Shakira Ybarra RN  Outcome: Progressing Towards Goal  2022 1147 by Shakira Ybarra RN  Outcome: Progressing Towards Goal  Goal: Diagnostic Test/Procedures  2022 1239 by Shakira Ybarra RN  Outcome: Progressing Towards Goal  2022 1147 by Shakira Ybarra RN  Outcome: Progressing Towards Goal  Goal: Nutrition/Diet  2022 1239 by Shakira Ybarra RN  Outcome: Progressing Towards Goal  2022 1147 by Shakira Ybarra RN  Outcome: Progressing Towards Goal  Goal: Discharge Planning  2022 1239 by Shakira Ybarra RN  Outcome: Progressing Towards Goal  2022 1147 by Shakira Ybarra RN  Outcome: Progressing Towards Goal  Goal: Medications  2022 1239 by Shakira Ybarra RN  Outcome: Progressing Towards Goal  2022 1147 by Shakira Ybarra RN  Outcome: Progressing Towards Goal  Goal: Respiratory  2022 1239 by Shakira Ybarra RN  Outcome: Progressing Towards Goal  2022 1147 by Shakira Ybarra RN  Outcome: Progressing Towards Goal  Goal: Treatments/Interventions/Procedures  2022 1239 by Shakira Ybarra RN  Outcome: Progressing Towards Goal  2022 1147 by Shakira Ybarra RN  Outcome: Progressing Towards Goal  Goal: Psychosocial  2022 1239 by Shakira Ybarra, RN  Outcome: Progressing Towards Goal  2022 1147 by Gladys Meng RN  Outcome: Progressing Towards Goal  Goal: *Vital signs within defined limits  2022 1239 by Gladys Meng RN  Outcome: Progressing Towards Goal  2022 1147 by Gladys Meng RN  Outcome: Progressing Towards Goal  Goal: *Labs within defined limits  2022 1239 by Gladsy Meng, RN  Outcome: Progressing Towards Goal  2022 1147 by Gladys Meng, RN  Outcome: Progressing Towards Goal  Goal: *Hemodynamically stable  2022 1239 by Gladys Meng, RN  Outcome: Progressing Towards Goal  2022 1147 by Gladys Meng RN  Outcome: Progressing Towards Goal  Goal: *Optimal pain control at patient's stated goal  2022 1239 by Gladys Meng RN  Outcome: Progressing Towards Goal  2022 1147 by Gladys Meng RN  Outcome: Progressing Towards Goal  Goal: *Participates in infant care  2022 1239 by Gladys Meng, RN  Outcome: Progressing Towards Goal  2022 1147 by Gladys Meng RN  Outcome: Progressing Towards Goal  Goal: *Demonstrates progressive activity  2022 1239 by Gladys Meng RN  Outcome: Progressing Towards Goal  2022 1147 by Gladys Meng RN  Outcome: Progressing Towards Goal  Goal: *Tolerating diet  2022 1239 by Gladys Meng RN  Outcome: Progressing Towards Goal  2022 1147 by Gladys Meng RN  Outcome: Progressing Towards Goal     Problem:  Delivery: Postpartum Day 1  Goal: Off Pathway (Use only if patient is Off Pathway)  2022 1239 by Gladys Meng RN  Outcome: Progressing Towards Goal  2022 1147 by Gladys Meng RN  Outcome: Progressing Towards Goal  Goal: Activity/Safety  2022 1239 by Gladys Meng RN  Outcome: Progressing Towards Goal  2022 1147 by Gladys Meng RN  Outcome: Progressing Towards Goal  Goal: Consults, if ordered  2022 1239 by Gladys Meng RN  Outcome: Progressing Towards Goal  2022 1147 by Gladys Meng RN  Outcome: Progressing Towards Goal  Goal: Diagnostic Test/Procedures  12/7/2022 1239 by Andrea Lee, RN  Outcome: Progressing Towards Goal  12/7/2022 1147 by Andrea Lee, RN  Outcome: Progressing Towards Goal  Goal: Nutrition/Diet  12/7/2022 1239 by Andrea Lee, RN  Outcome: Progressing Towards Goal  12/7/2022 1147 by Andrea Lee, RN  Outcome: Progressing Towards Goal  Goal: Discharge Planning  12/7/2022 1239 by Andrea Lee, RN  Outcome: Progressing Towards Goal  12/7/2022 1147 by Andrea Lee, RN  Outcome: Progressing Towards Goal  Goal: Medications  12/7/2022 1239 by Andrea Lee, RN  Outcome: Progressing Towards Goal  12/7/2022 1147 by Andrea Lee, RN  Outcome: Progressing Towards Goal  Goal: Respiratory  12/7/2022 1239 by Andrea Lee, RN  Outcome: Progressing Towards Goal  12/7/2022 1147 by Andrea Lee, RN  Outcome: Progressing Towards Goal  Goal: Treatments/Interventions/Procedures  12/7/2022 1239 by Andrea Lee, RN  Outcome: Progressing Towards Goal  12/7/2022 1147 by Andrea Lee, RN  Outcome: Progressing Towards Goal  Goal: Psychosocial  12/7/2022 1239 by Andrea Lee, RN  Outcome: Progressing Towards Goal  12/7/2022 1147 by Andrea Lee, RN  Outcome: Progressing Towards Goal  Goal: *Vital signs within defined limits  12/7/2022 1239 by Andrea Lee, RN  Outcome: Progressing Towards Goal  12/7/2022 1147 by Andrea Lee, RN  Outcome: Progressing Towards Goal  Goal: *Labs within defined limits  12/7/2022 1239 by Andrea Lee, RN  Outcome: Progressing Towards Goal  12/7/2022 1147 by Andrea Lee, RN  Outcome: Progressing Towards Goal  Goal: *Hemodynamically stable  12/7/2022 1239 by Andrea Lee, RN  Outcome: Progressing Towards Goal  12/7/2022 1147 by Andrea Lee, RN  Outcome: Progressing Towards Goal  Goal: *Optimal pain control at patient's stated goal  12/7/2022 1239 by Penne [place], RN  Outcome: Progressing Towards Goal  12/7/2022 1147 by Rodrigue Aleman OSVALDO RN  Outcome: Progressing Towards Goal  Goal: *Participates in infant care  2022 1239 by Leopoldo Canterbury, RN  Outcome: Progressing Towards Goal  2022 1147 by Leopoldo Canterbury, RN  Outcome: Progressing Towards Goal  Goal: *Demonstrates progressive activity  2022 1239 by Leopoldo Canterbury, RN  Outcome: Progressing Towards Goal  2022 1147 by Leopoldo Canterbury, RN  Outcome: Progressing Towards Goal  Goal: *Tolerating diet  2022 1239 by Leopoldo Canterbury, RN  Outcome: Progressing Towards Goal  2022 1147 by Leopoldo Canterbury, RN  Outcome: Progressing Towards Goal  Goal: *Performs self perineal care  2022 1239 by Leopoldo Canterbury, RN  Outcome: Progressing Towards Goal  2022 1147 by Leopoldo Canterbury, RN  Outcome: Progressing Towards Goal     Problem:  Delivery: Postpartum Day 2  Goal: Off Pathway (Use only if patient is Off Pathway)  2022 1239 by Leopoldo Canterbury, MAUREEN  Outcome: Progressing Towards Goal  2022 1147 by Leopoldo Canterbury, RN  Outcome: Progressing Towards Goal  Goal: Activity/Safety  2022 1239 by Leopoldo Canterbury, RN  Outcome: Progressing Towards Goal  2022 1147 by Leopoldo Canterbury, RN  Outcome: Progressing Towards Goal  Goal: Consults, if ordered  2022 1239 by Leopoldo Canterbury, RN  Outcome: Progressing Towards Goal  2022 1147 by Leopoldo Canterbury, RN  Outcome: Progressing Towards Goal  Goal: Nutrition/Diet  2022 1239 by Leopoldo Canterbury, RN  Outcome: Progressing Towards Goal  2022 1147 by Leopoldo Canterbury, RN  Outcome: Progressing Towards Goal  Goal: Discharge Planning  2022 1239 by Leopoldo Canterbury, RN  Outcome: Progressing Towards Goal  2022 1147 by Leopoldo Canterbury, RN  Outcome: Progressing Towards Goal  Goal: Medications  2022 1239 by Leopoldo Canterbury, RN  Outcome: Progressing Towards Goal  2022 1147 by Leopoldo Canterbury, RN  Outcome: Progressing Towards Goal  Goal: Treatments/Interventions/Procedures  2022 1239 by Leopoldo Canterbury, RN  Outcome: Progressing Towards Goal  2022 1147 by Placido Islas RN  Outcome: Progressing Towards Goal  Goal: Psychosocial  2022 1239 by Placido Islas RN  Outcome: Progressing Towards Goal  2022 1147 by Placido Islas RN  Outcome: Progressing Towards Goal  Goal: *Vital signs within defined limits  2022 1239 by Placido Islas, RN  Outcome: Progressing Towards Goal  2022 1147 by Placido Islas RN  Outcome: Progressing Towards Goal  Goal: *Labs within defined limits  2022 1239 by Placido Islas RN  Outcome: Progressing Towards Goal  2022 1147 by Placido Islas RN  Outcome: Progressing Towards Goal  Goal: *Hemodynamically stable  2022 1239 by Placido Islas RN  Outcome: Progressing Towards Goal  2022 1147 by Placido Islas RN  Outcome: Progressing Towards Goal  Goal: *Optimal pain control at patient's stated goal  2022 1239 by Placido Islas RN  Outcome: Progressing Towards Goal  2022 1147 by Placido Islas RN  Outcome: Progressing Towards Goal  Goal: *Participates in infant care  2022 1239 by Placido Islas RN  Outcome: Progressing Towards Goal  2022 1147 by Placido Islas RN  Outcome: Progressing Towards Goal  Goal: *Demonstrates progressive activity  2022 1239 by Placido Islas RN  Outcome: Progressing Towards Goal  2022 1147 by Placido Islas RN  Outcome: Progressing Towards Goal  Goal: *Appropriate parent-infant bonding  2022 1239 by Placido Islas RN  Outcome: Progressing Towards Goal  2022 1147 by Placido Islas RN  Outcome: Progressing Towards Goal  Goal: *Tolerating diet  2022 1239 by Placido Islas RN  Outcome: Progressing Towards Goal  2022 1147 by Placido Islas RN  Outcome: Progressing Towards Goal     Problem:  Delivery: Postpartum Day 3  Goal: Off Pathway (Use only if patient is Off Pathway)  2022 1239 by Placido Islas RN  Outcome: Progressing Towards Goal  2022 1147 by Hemanth Melgar, RN  Outcome: Progressing Towards Goal  Goal: Activity/Safety  2022 1239 by Hemanth Melgar, RN  Outcome: Progressing Towards Goal  2022 1147 by Hemanth Melgar, RN  Outcome: Progressing Towards Goal  Goal: Consults, if ordered  2022 1239 by Hemanth Melgar, RN  Outcome: Progressing Towards Goal  2022 1147 by Hemanth Melgar, RN  Outcome: Progressing Towards Goal  Goal: Nutrition/Diet  2022 1239 by Hemanth Melgar, RN  Outcome: Progressing Towards Goal  2022 1147 by Hemanth Melgar, RN  Outcome: Progressing Towards Goal  Goal: Discharge Planning  2022 1239 by Hemanth Melgar, RN  Outcome: Progressing Towards Goal  2022 1147 by Hemanth Melgar, RN  Outcome: Progressing Towards Goal  Goal: Medications  2022 1239 by Hemanth Melgar, RN  Outcome: Progressing Towards Goal  2022 1147 by Hemanth Melgar, RN  Outcome: Progressing Towards Goal  Goal: Treatments/Interventions/Procedures  2022 1239 by Hemanth Melgar, RN  Outcome: Progressing Towards Goal  2022 1147 by Hemanth Melgar, RN  Outcome: Progressing Towards Goal  Goal: Psychosocial  2022 1239 by Hemanth Melgar, RN  Outcome: Progressing Towards Goal  2022 1147 by Hemanth Melgar, RN  Outcome: Progressing Towards Goal     Problem:  Delivery: Discharge Outcomes  Goal: *Follow-up appointments as indicated  2022 1239 by Hemanth Melgar, RN  Outcome: Progressing Towards Goal  2022 1147 by Hemanth Melgar, RN  Outcome: Progressing Towards Goal  Goal: *Describes available resources and support systems  2022 1239 by Hemanth Melgar, RN  Outcome: Progressing Towards Goal  2022 1147 by Hemanth Melgar, RN  Outcome: Progressing Towards Goal  Goal: *No signs and symptoms of infection  2022 1239 by Hemanth Melgar, RN  Outcome: Progressing Towards Goal  2022 1147 by Hemanth Melgar, RN  Outcome: Progressing Towards Goal  Goal: *Birth certificate information completed  2022 [address] by Bill Storey RN  Outcome: Progressing Towards Goal  12/7/2022 1147 by Bill Storey RN  Outcome: Progressing Towards Goal  Goal: *Received and verbalizes understanding of discharge plan and instructions  12/7/2022 1239 by Bill Storey RN  Outcome: Progressing Towards Goal  12/7/2022 1147 by Bill Storey RN  Outcome: Progressing Towards Goal  Goal: *Vital signs within defined limits  12/7/2022 1239 by Bill Storey RN  Outcome: Progressing Towards Goal  12/7/2022 1147 by Bill Storey RN  Outcome: Progressing Towards Goal  Goal: *Labs within defined limits  12/7/2022 1239 by Bill Storey RN  Outcome: Progressing Towards Goal  12/7/2022 1147 by Bill Storey RN  Outcome: Progressing Towards Goal  Goal: *Hemodynamically stable  12/7/2022 1239 by Bill Storey RN  Outcome: Progressing Towards Goal  12/7/2022 1147 by Bill Storey RN  Outcome: Progressing Towards Goal  Goal: *Optimal pain control at patient's stated goal  12/7/2022 1239 by Bill Storey RN  Outcome: Progressing Towards Goal  12/7/2022 1147 by Bill Storey RN  Outcome: Progressing Towards Goal  Goal: *Participates in infant care  12/7/2022 1239 by Bill Storey RN  Outcome: Progressing Towards Goal  12/7/2022 1147 by Bill Storey RN  Outcome: Progressing Towards Goal  Goal: *Demonstrates progressive activity  12/7/2022 1239 by Bill Storey RN  Outcome: Progressing Towards Goal  12/7/2022 1147 by Bill Storey RN  Outcome: Progressing Towards Goal  Goal: *Appropriate parent-infant bonding  12/7/2022 1239 by Bill Storey RN  Outcome: Progressing Towards Goal  12/7/2022 1147 by Bill Storey RN  Outcome: Progressing Towards Goal  Goal: *Tolerating diet  12/7/2022 1239 by Bill Storey RN  Outcome: Progressing Towards Goal  12/7/2022 1147 by Bill Storey RN  Outcome: Progressing Towards Goal  Goal: *Verbalizes name, dosage, time, side effects, and number of days to continue medications  12/7/2022 1239 by Kathy Linda RN  Outcome: Progressing Towards Goal  12/7/2022 1147 by Kathy Linda RN  Outcome: Progressing Towards Goal  Goal: *Influenza vaccine administered (October-March)  12/7/2022 1239 by Kathy Linda RN  Outcome: Progressing Towards Goal  12/7/2022 1147 by Kathy Linda RN  Outcome: Progressing Towards Goal

## 2022-12-07 NOTE — PROGRESS NOTES
9916 Report received from [facility]. Dr. Nena Irizarry at bedside consenting patient for anesthesia. Tenisha Jimenez CRNA at bedside    0730 Patient and  expressed interest in talking to Dr. Cm Dorantes regarding  vs. C/S    0885 Dr. Cm Dorantes at bedside. Pt and  would like to proceed with repeat c/s.    0720 Pt taken back to room via PP bed by writer and CRNA.     0945 Ice chips given to patient per request    [phone] Patient given water and saltines per request. Deborra Rily well by patient. 1055 Pt orally hydrating and more than adequate urine output. Per Esequiel benitez to decrease IV rate to 75ml/hr. 1150 Pt transferred to [facility] room 325 via bed with writer assist. No apparent distress noted at this time. Report given to Leti Smalls RN in [facility].

## 2022-12-07 NOTE — OP NOTES
Section Delivery Procedure Note         Name: Attila Ramsey      Medical Record Number: [id number]      YOB: 1985     Today's Date: 2022      Preoperative Diagnosis: Prior Uterine Surgery, active labor    Postoperative Diagnosis: Repeat low transverse  section    Procedure: Repeat low transverse  section via pfannesteil skin incision, removal of right fallopian tube peritoneal cyst    Surgeon(s):  Aby St MD    Assistant(s): Skyla Dai RN; Douglas Ye RN    Anesthesia: Spinal    Fluids: per anesthesia records    Urine output: 700ccs    Estimated blood loss: 500ccs    Prophylactic Antibiotics: clindamycin and gentamicin    Findings: Live male infant, cephalic, OP, clear amniotic fluid; normal bilateral fallopian tubes and ovaries; simple 2cm right fallopian tube peritoneal cyst    Fetal Description: razo male    Birth Information:   Information for the patient's :  Naina Liner [[id number]]        Date: 22  Time: 0828am  Gender: male  Weight: 8lbs 0.8oz  APGAR:   1min: 8  5min: 9  55GDR: 9    Umbilical Cord: 3 vessels present    Placenta:  expressed    Specimens:   ID Type Source Tests Collected by Time Destination   1 : Right fallopian tube: Peritoneal Cyst Fresh Cyst  Aby St MD 2022 9953 Pathology               Complications:  none    Indications: Patient is a 41 yo G2 now  who presented in labor, history of  section x 1 for prolonged pushing (4 hours, and OP presentation). Discussed recommendation for repeat  section. Patient agreed. Discussed with patient risks of procedure to include but not be limited to risk of damage to the uterus, and surrounding organs including bowel, bladder, nerves, blood vessels, bleeding, need for transfusion and its associated risks, infection, reoperation, prolonged hospital stay, shoulder dystocia, and rarely hysterectomy.  Patient understood and desired to proceed. Procedure Details:    Patient was taken to the operating room where she received spinal anesthesia without difficulty. She received Clindamycin and gentamicin for infection prophylaxis. She was placed in the supine position with a leftward lateral tilt. A Posada catheter was inserted under sterile conditions while on labor and delivery. She was prepped and draped in the normal sterile fashion. Surgical timeout was completed. Anesthesia was tested and noted to be adequate. A low transverse skin incision was made with the use of the scalpel thru the old  scar. The incision was carried down to the level of the fascia. Hemostasis was achieved via the use of the Bovie. The fascia was then sharply incised in an upper and lateral tilt using curved Sharma scissors. The superior aspect of the fascia was grasped with Kocher clamps and the underlying rectus muscles were sharply dissected off using Sharma scissors. In a similar fashion, the inferior aspect of the fascia were grasped with Kocher clamps and the underlying rectus and pyramidalis muscles were sharply dissected off. Peritoneum was bluntly entered. The peritoneal incision was extended in a lateral fashion sharply. The uterus was inspected. Fetal presentation was noted to be cephalic. An Humza O retractor was inserted into the abdominal cavity. The vesicouterine peritoneum was identified and sharply dissected to create a bladder flap. The lower uterine segment was thin and ballooning out but intrauterine contents not visible. A low transverse uterine incision was then made with a scalpel down to the level of the amniotic sac. The amniotic sac was sharply entered. Clear amniotic fluid was noted. The fetal head was gently elevated to the uterine incision. We were careful not to use the uterine incision as a fulcrum. Gentle fundal pressure was applied and the infant head was delivered without difficulty. This was followed by the shoulders and the body. Mouth and nose were bulb suctioned. Delayed cord clamping was observed. Cord was then clamped ×2 and cut. The infant was handed to nursing. Cord blood was obtained. The placenta was delivered via fundal massage, 3 vessel cord, and intact. The retractor was removed. The inside of the uterus cleared of clot and debris using a laparotomy sponge. The hysterotomy site was grasped with Penningtons. The uterine incision was then reapproximated with 0-0 Vicryl in the usual running locked fashion x 2 layers. Hemostasis was noted. Right fallopian tube noted to have 2cm simple peritoneal cyst near fimbriated end. It was clamped and sharply transected off. Hemostasis noted. Abdominal wound sweep was negative. The underlying rectus muscles and fascia were examined and no bleeding was noted. The peritoneum was closed with 2-0 vicryl in a running fashion. The fascia was then closed with 0-0 Vicryl in a continuous running fashion. The subcutaneous layer was not reapproximated as less than 2 cm. The skin was closed with 3-0 Monocryl via a subcuticular stitch. A pressure dressing was applied to the incision. Patient tolerated the procedure well. All the needle lap and instrument counts were correct ×2. She was taken to the recovery room in a stable condition.      Signed: Florence Dumont MD      December 7, 2022

## 2022-12-07 NOTE — PROGRESS NOTES
1155-Received pt to room 325 from labor and delivery via bed, received report from Yuriy Chau and assumed care of pt. Room orientation and assessment complete. Call device within reach. 1310-Patient reports no needs or concerns at this time, s/o at bedside, pt holding baby. 1430-Patient reports no needs or concerns at this time.

## 2022-12-08 LAB
BASOPHILS # BLD: 0 K/UL (ref 0–0.1)
BASOPHILS NFR BLD: 0 % (ref 0–1)
DIFFERENTIAL METHOD BLD: ABNORMAL
EOSINOPHIL # BLD: 0 K/UL (ref 0–0.4)
EOSINOPHIL NFR BLD: 0 % (ref 0–7)
ERYTHROCYTE [DISTWIDTH] IN BLOOD BY AUTOMATED COUNT: 13.3 % (ref 11.5–14.5)
HCT VFR BLD AUTO: 24.7 % (ref 35–47)
HGB BLD-MCNC: 8 G/DL (ref 11.5–16)
IMM GRANULOCYTES # BLD AUTO: 0.1 K/UL (ref 0–0.04)
IMM GRANULOCYTES NFR BLD AUTO: 1 % (ref 0–0.5)
LYMPHOCYTES # BLD: 1.9 K/UL (ref 0.8–3.5)
LYMPHOCYTES NFR BLD: 18 % (ref 12–49)
MCH RBC QN AUTO: 27.8 PG (ref 26–34)
MCHC RBC AUTO-ENTMCNC: 32.4 G/DL (ref 30–36.5)
MCV RBC AUTO: 85.8 FL (ref 80–99)
MONOCYTES # BLD: 0.6 K/UL (ref 0–1)
MONOCYTES NFR BLD: 6 % (ref 5–13)
NEUTS SEG # BLD: 7.8 K/UL (ref 1.8–8)
NEUTS SEG NFR BLD: 75 % (ref 32–75)
NRBC # BLD: 0 K/UL (ref 0–0.01)
NRBC BLD-RTO: 0 PER 100 WBC
PLATELET # BLD AUTO: 187 K/UL (ref 150–400)
PMV BLD AUTO: 10.2 FL (ref 8.9–12.9)
RBC # BLD AUTO: 2.88 M/UL (ref 3.8–5.2)
WBC # BLD AUTO: 10.4 K/UL (ref 3.6–11)

## 2022-12-08 PROCEDURE — 74011250637 HC RX REV CODE- 250/637: Performed by: OBSTETRICS & GYNECOLOGY

## 2022-12-08 PROCEDURE — 36415 COLL VENOUS BLD VENIPUNCTURE: CPT

## 2022-12-08 PROCEDURE — 85025 COMPLETE CBC W/AUTO DIFF WBC: CPT

## 2022-12-08 PROCEDURE — 65410000002 HC RM PRIVATE OB

## 2022-12-08 RX ADMIN — IBUPROFEN 800 MG: 800 TABLET, FILM COATED ORAL at 22:22

## 2022-12-08 RX ADMIN — DOCUSATE SODIUM 100 MG: 100 CAPSULE, LIQUID FILLED ORAL at 20:02

## 2022-12-08 RX ADMIN — OXYCODONE AND ACETAMINOPHEN 1 TABLET: 5; 325 TABLET ORAL at 18:37

## 2022-12-08 RX ADMIN — IBUPROFEN 800 MG: 800 TABLET, FILM COATED ORAL at 15:23

## 2022-12-08 RX ADMIN — IBUPROFEN 800 MG: 800 TABLET, FILM COATED ORAL at 07:04

## 2022-12-08 NOTE — PROGRESS NOTES
Progress Note    Patient: Omi Potts MRN: [id number]  SSN: [id number]    YOB: 1985  Age: 40 y.o. Sex: female      Admit Date: 2022    LOS: 1 day     Subjective:     No Complaints    Objective:     Vitals:    22 2359 22 0109 22 0316 22 0801   BP: (!) 101/52  (!) 102/53 102/60   Pulse: 70  69 68   Resp: 18 18 18 18   Temp: 97.8 °F (36.6 °C)  97.3 °F (36.3 °C) 97.6 °F (36.4 °C)   SpO2: 98%  99% 98%   Weight:       Height:            Intake and Output:  Current Shift: No intake/output data recorded. Last three shifts:  1901 -  0700  In: 3650 [P.O.:1500; I.V.:2150]  Out: 5550 [Urine:5050]    Physical Exam:   Abd:  FF  INC: CDI    Lab/Data Review: All lab results for the last 24 hours reviewed.          Assessment:     Principal Problem:    Status post repeat low transverse  section (2022)    Active Problems:    History of  section (2022)        Plan:     Routine PP/PO orders: PPD # 1/POD #1 RCS    Signed By: Jesu Wells MD     2022

## 2022-12-09 VITALS
OXYGEN SATURATION: 100 % | TEMPERATURE: 97.8 F | BODY MASS INDEX: 27.51 KG/M2 | SYSTOLIC BLOOD PRESSURE: 109 MMHG | RESPIRATION RATE: 18 BRPM | DIASTOLIC BLOOD PRESSURE: 73 MMHG | WEIGHT: 155.25 LBS | HEIGHT: 63 IN | HEART RATE: 82 BPM

## 2022-12-09 PROCEDURE — 74011250637 HC RX REV CODE- 250/637: Performed by: OBSTETRICS & GYNECOLOGY

## 2022-12-09 RX ADMIN — IBUPROFEN 800 MG: 800 TABLET, FILM COATED ORAL at 06:02

## 2022-12-09 NOTE — DISCHARGE SUMMARY
Discharge Summary     Patient: Kwame Hanna MRN: [id number]  SSN: [id number]    YOB: 1985  Age: 40 y.o. Sex: female       Admit Date: 2022    Discharge Date: 2022      Admission Diagnoses: Abdominal pain [R10.9]; History of  section [Z98.891]    Discharge Diagnoses: Status post repeat low transverse  section  Problem List as of 2022 Date Reviewed: 2022            Codes Class Noted - Resolved    History of  section ICD-10-CM: Z98.891  ICD-9-CM: V45.89  2022 - Present        * (Principal) Status post repeat low transverse  section ICD-10-CM: Z98.891  ICD-9-CM: V45.89  2022 - Present        History of  delivery ICD-10-CM: Z98.891  ICD-9-CM: V45.89  2022 - Present        RESOLVED: Uterine contractions ICD-10-CM: O47.9  ICD-9-CM: 644.10  2022 - 2022        RESOLVED: 38 weeks gestation of pregnancy ICD-10-CM: Z3A.38  ICD-9-CM: V22.2  2022 - 2022        RESOLVED: Pregnancy ICD-10-CM: Z34.90  ICD-9-CM: V22.2  2022 - 2022        RESOLVED: High-risk pregnancy ICD-10-CM: O09.90  ICD-9-CM: V23.9  2022 - 2022        RESOLVED: AMA (advanced maternal age) multigravida 35+ ICD-10-CM: O09.529  ICD-9-CM: 659.60  2022 - 2022            Discharge Condition: Good    Hospital Course: Patient was admitted to the hospital and underwent repeat low transverse  section without complication. She did well postoperatively and was able to tolerate a regular diet on the first postoperative day. She remained afebrile throughout her hospital course. She was subsequently discharged home on the second postoperative day in good condition.     Consults: None    Significant Diagnostic Studies: labs: None    Disposition: Home self-care with office follow-up    Discharge Medications:   Current Discharge Medication List        START taking these medications    Details   ibuprofen (MOTRIN) 800 mg tablet Take 1 Tablet by mouth every eight (8) hours as needed for Pain. Qty: 60 Tablet, Refills: 0    Associated Diagnoses: Status post repeat low transverse  section      oxyCODONE-acetaminophen (PERCOCET) 5-325 mg per tablet Take 1 Tablet by mouth every four (4) hours as needed for Pain for up to 5 days. Max Daily Amount: 6 Tablets. Qty: 30 Tablet, Refills: 0    Associated Diagnoses: Status post repeat low transverse  section      docusate sodium (COLACE) 100 mg capsule Take 1 Capsule by mouth two (2) times daily as needed for Constipation for up to 60 doses. Indications: constipation  Qty: 60 Capsule, Refills: 0    Associated Diagnoses: Status post repeat low transverse  section           CONTINUE these medications which have NOT CHANGED    Details   prenatal vit-iron fumarate-fa 27 mg iron- 0.8 mg tab tablet Take 1 Tablet by mouth daily.            STOP taking these medications       linaCLOtide (Linzess) 145 mcg cap capsule Comments:   Reason for Stopping:               Activity: Increase activity as tolerated  Diet: Regular  Wound Care: Keep wound clean and dry    Follow-up Appointments   Procedures    FOLLOW UP VISIT Appointment in: 6 Weeks     Standing Status:   Standing     Number of Occurrences:   1     Order Specific Question:   Appointment in     Answer:   6 Weeks       Signed By: Jigar Carrillo MD     2022

## 2022-12-09 NOTE — PROGRESS NOTES
Discharge plan of care validated with provider discharge order. 1500- Discharge instructions printed out and provided to patient. Patient verbalized instructions explained to her with all questions answered. Patient instructed to call OB office to schedule 1 week follow-up appointment with Dr Mariama Rose for incision check and a 6 week postpartum appointment with Dr Jelani Lanier. Patient verbalized understanding. Patient knows when to call MD or [id number]- Patient discharged via wheelchair to front lobby. Baby in car seat.

## 2022-12-09 NOTE — DISCHARGE INSTRUCTIONS
Depression After Childbirth: Care Instructions  It's common to lose sleep, feel irritable, and cry easily during the first few days after childbirth. Hormone changes and the demands of a new baby can cause these \"baby blues. \" If these mood changes last more than 2 weeks, you may have postpartum depression. This is a medical condition that requires treatment. If you have any of these signs, you may be depressed. See your doctor right away. You feel very sad or hopeless and lose interest in daily activities. You sleep too much or not enough. You feel tired or as if you have no energy. You eat too much or too little. You write or talk about death. Where to get help 24 hours a day, 7 days a week   If you or someone you know talks about suicide, self-harm, a mental health crisis, a substance use crisis, or any other kind of emotional distress, get help right away. You can:   Call the Suicide and Crisis Lifeline at 65. Call 1-477-548-TKIF (). 2-840.933.8625    Text HOME to 262951 to access the Crisis Text Line. Consider saving these numbers in your phone. What you can do   Try to go to all of your counseling sessions. Take medicines as directed. Eat healthy foods. Get daily exercise, such as walks. Try to get some sunlight every day. Avoid using alcohol or other substances. Get as much rest as possible. Connect with friends, and join a support group for new parents. When should you call for help? Call 438 if: You feel you cannot stop from hurting yourself, your baby, or someone else. Call your doctor now or seek immediate medical care if:    You are having trouble caring for yourself or your baby. You hear voices. Contact your doctor if:    You have problems with your medicines. You do not get better as expected. Follow-up care is a key part of your treatment and safety.  Be sure to make and go to all appointments, and call your doctor if you are having problems. It's also a good idea to know your test results and keep a list of the medicines you take. Where can you learn more? Go to http://www.gray.com/  Enter [id number] in the search box to learn more about \"Depression After Childbirth: Care Instructions. \"  Current as of: February 9, 2022               Content Version: 13.4  © 2006-2022 [employer]. Care instructions adapted under license by [employer] (which disclaims liability or warranty for this information). If you have questions about a medical condition or this instruction, always ask your healthcare professional. [place] [address] any warranty or liability for your use of this information. Constipation: Care Instructions  Overview     Constipation means that you have a hard time passing stools (bowel movements). People pass stools from 3 times a day to once every 3 days. What is normal for you may be different. Constipation may occur with pain in the rectum and cramping. The pain may get worse when you try to pass stools. Sometimes there are small amounts of bright red blood on toilet paper or the surface of stools. This is because of enlarged veins near the rectum (hemorrhoids). A few changes in your diet and lifestyle may help you avoid ongoing constipation. Your doctor may also prescribe medicine to help loosen your stool. Some medicines can cause constipation. These include pain medicines and antidepressants. Tell your doctor about all the medicines you take. Your doctor may want to make a medicine change to ease your symptoms. Follow-up care is a key part of your treatment and safety. Be sure to make and go to all appointments, and call your doctor if you are having problems. It's also a good idea to know your test results and keep a list of the medicines you take. How can you care for yourself at home? Drink plenty of fluids.  If you have kidney, heart, or liver disease and have to limit fluids, talk with your doctor before you increase the amount of fluids you drink. Include high-fiber foods in your diet each day. These include fruits, vegetables, beans, and whole grains. Get at least 30 minutes of exercise on most days of the week. Walking is a good choice. You also may want to do other activities, such as running, swimming, cycling, or playing tennis or team sports. Take a fiber supplement, such as Citrucel or Metamucil, every day. Read and follow all instructions on the label. Schedule time each day for a bowel movement. A daily routine may help. Take your time having a bowel movement, but don't sit for more than 10 minutes at a time. And don't strain too much. Support your feet with a small step stool when you sit on the toilet. This helps flex your hips and places your pelvis in a squatting position. Your doctor may recommend an over-the-counter laxative to relieve your constipation. Examples are Milk of Magnesia and MiraLax. Read and follow all instructions on the label. Do not use laxatives on a long-term basis. When should you call for help? Call your doctor now or seek immediate medical care if:    You have new or worse belly pain. You have new or worse nausea or vomiting. You have blood in your stools. Watch closely for changes in your health, and be sure to contact your doctor if:    Your constipation is getting worse. You do not get better as expected. Where can you learn more? Go to http://www.martinez.com/  Enter P343 in the search box to learn more about \"Constipation: Care Instructions. \"  Current as of: June 6, 2022               Content Version: 13.4  © 7798-9529 [employer]. Care instructions adapted under license by [employer] (which disclaims liability or warranty for this information).  If you have questions about a medical condition or this instruction, always ask your healthcare professional. [address] any warranty or liability for your use of this information. Breastfeeding: Care Instructions  Overview     Breastfeeding has many benefits. It may lower your baby's chances of getting an infection. It also may make it less likely that your baby will have problems such as diabetes and obesity later in life. Breastfeeding also helps you bond with your baby. In the first days after birth, your breasts make a thick, yellow liquid called colostrum. This liquid gives your baby nutrients and antibodies against infection. It is all that babies need in the first days after birth. Your breasts will fill with milk a few days after the birth. Breastfeeding is a skill that gets better with practice. Be patient with yourself and your baby. If you have trouble, you can get help and keep breastfeeding. Follow-up care is a key part of your treatment and safety. Be sure to make and go to all appointments, and call your doctor if you are having problems. It's also a good idea to know your test results and keep a list of the medicines you take. How can you care for yourself at home? Breastfeed your baby whenever your baby is hungry. In the first 2 weeks, your baby will breastfeed at least 8 times in a 24-hour period. This will help you keep up your supply of milk. Signs that your baby is hungry include:  Sucking on their hands. [place] their lips. Turning their head toward your breast.  Put a bed pillow or a nursing pillow on your lap to support your arms and your baby. Hold your baby in a comfortable position. You can hold your baby in several ways. One of the most common positions is the cradle hold. One arm supports your baby, with your baby's head in the bend of your elbow. Your open hand supports your baby's bottom or back. Your baby's belly lies against yours. If you had your baby by , or , try the football hold.  This position keeps your baby off your belly. Tuck your baby under your arm, with your baby's body along the side you will be feeding on. Support your baby's upper body with your arm. With that hand you can control your baby's head to bring their mouth to your breast.  Try different positions with each feeding. If you are having problems, ask for help from your doctor or a lactation consultant. To get your baby to latch on:  Support and narrow your breast with one hand using a \"U hold,\" with your thumb on the outer side of your breast and your fingers on the inner side. You can also use a \"C hold,\" with all your fingers below the nipple and your thumb above it. Try the different holds to get the deepest latch for whichever breastfeeding position you use. Your other arm is behind your baby's back, with your hand supporting the base of the baby's head. Position your fingers and thumb to point toward your baby's ears. You can touch your baby's lower lip with your nipple to get your baby's mouth to open. Wait until your baby opens up really wide, like a big yawn. Then be sure to bring the baby quickly to your breast--not your breast to the baby. As you bring your baby toward your breast, use your other hand to support the breast and guide it into your baby's mouth. Both the nipple and a large portion of the darker area around the nipple (areola) should be in the baby's mouth. The baby's lips should be flared outward, not folded in (inverted). Listen for a regular sucking and swallowing pattern while the baby is feeding. If you can't see or hear a swallowing pattern, watch the baby's ears. They will wiggle slightly when the baby swallows. If the baby's nose appears to be blocked by your breast, bring your baby's body closer to you. This will help tilt the baby's head back slightly, so just the edge of one nostril is clear for breathing.   When your baby is latched, you can usually remove your hand from supporting your breast and use it to cradle under your baby. Now just relax and breastfeed your baby. You will know that your baby is feeding well when: Your baby's mouth covers a lot of the areola, and the lips are flared out. Your baby's chin and nose rest against your breast.  Sucking is deep and rhythmic, with short pauses. You are able to see and hear your baby swallowing. You do not feel pain in your nipple. Offer both breasts to your baby at each feeding. Each time you breastfeed, switch which breast you start with. Anytime you need to remove your baby from the breast, put one finger in the corner of your baby's mouth. Push your finger between your baby's gums to gently break the seal. If you don't break the tight seal before you remove your baby, your nipples can become sore, cracked, or bruised. After you finish feeding, gently pat your baby's back to let out any swallowed air. After your baby burps, offer the breast again, or offer the other breast. Sometimes a baby will want to keep feeding after being burped. When should you call for help? Call your doctor now or seek immediate medical care if:    You have symptoms of a breast infection, such as: Increased pain, swelling, redness, or warmth around a breast.  Red streaks extending from the breast.  Pus draining from a breast.  A fever. Your baby has no wet diapers for 6 hours. Watch closely for changes in your health, and be sure to contact your doctor if:    Your baby has trouble latching on to your breast.     You continue to have pain or discomfort when breastfeeding. You have other questions or concerns. Where can you learn more? Go to http://zainab-anahi.info/  Enter P492 in the search box to learn more about \"Breastfeeding: Care Instructions. \"  Current as of: February 23, 2022               Content Version: 13.4  © 5605-9402 [employer].    Care instructions adapted under license by [employer] (which disclaims liability or warranty for this information). If you have questions about a medical condition or this instruction, always ask your healthcare professional. [place] [address] any warranty or liability for your use of this information.  Section: What to Expect at [address] [facility] [address]     A  section, or , is surgery to deliver your baby through a cut that the doctor makes in your lower belly and uterus. The cut is called an incision. You may have some pain in your lower belly and need pain medicine for 1 to 2 weeks. You can expect some vaginal bleeding for several weeks. You will probably need about 6 weeks to fully recover. It's important to take it easy while the incision heals. Avoid heavy lifting, strenuous activities, and exercises that strain the belly muscles while you recover. Ask a family member or friend for help with housework, cooking, and shopping. This care sheet gives you a general idea about how long it will take for you to recover. But each person recovers at a different pace. Follow the steps below to get better as quickly as possible. How can you care for yourself at home? Activity    Rest when you feel tired. Getting enough sleep will help you recover. Try to walk each day. Start by walking a little more than you did the day before. Bit by bit, increase the amount you walk. Walking boosts blood flow and helps prevent pneumonia, constipation, and blood clots. Avoid strenuous activities, such as bicycle riding, jogging, weightlifting, and aerobic exercise, for 6 weeks or until your doctor says it is okay. Until your doctor says it is okay, do not lift anything heavier than your baby. Do not do sit-ups or other exercises that strain the belly muscles for 6 weeks or until your doctor says it is okay. Hold a pillow over your incision when you cough or take deep breaths. This will support your belly and decrease your pain.      You may shower as usual. Tayla Villegas the incision dry when you are done. You will have some vaginal bleeding. Wear sanitary pads. Do not douche or use tampons until your doctor says it is okay. Ask your doctor when you can drive again. You will probably need to take at least 6 weeks off work. It depends on the type of work you do and how you feel. Ask your doctor when it is okay for you to have sex. Diet    You can eat your normal diet. If your stomach is upset, try bland, low-fat foods like plain rice, broiled chicken, toast, and yogurt. Drink plenty of fluids (unless your doctor tells you not to). You may notice that your bowel movements are not regular right after your surgery. This is common. Try to avoid constipation and straining with bowel movements. You may want to take a fiber supplement every day. If you have not had a bowel movement after a couple of days, ask your doctor about taking a mild laxative. If you are breastfeeding, limit alcohol. Alcohol can cause a lack of energy and other health problems for the baby when a breastfeeding woman drinks heavily. It can also get in the way of a mom's ability to feed her baby or to care for the child in other ways. There isn't a lot of research about exactly how much alcohol can harm a baby. Having no alcohol is the safest choice for your baby. If you choose to have a drink now and then, have only one drink, and limit the number of occasions that you have a drink. Wait to breastfeed at least 2 hours after you have a drink to reduce the amount of alcohol the baby may get in the milk. Medicines    Your doctor will tell you if and when you can restart your medicines. You will also get instructions about taking any new medicines. If you stopped taking aspirin or some other blood thinner, your doctor will tell you when to start taking it again. Take pain medicines exactly as directed.   If the doctor gave you a prescription medicine for pain, take it as prescribed. If you are not taking a prescription pain medicine, ask your doctor if you can take an over-the-counter medicine. If you think your pain medicine is making you sick to your stomach: Take your medicine after meals (unless your doctor has told you not to). Ask your doctor for a different pain medicine. If your doctor prescribed antibiotics, take them as directed. Do not stop taking them just because you feel better. You need to take the full course of antibiotics. Incision care    If you have strips of tape on the incision, leave the tape on for a week or until it falls off. Wash the area daily with warm, soapy water, and pat it dry. Don't use hydrogen peroxide or alcohol, which can slow healing. You may cover the area with a gauze bandage if it weeps or rubs against clothing. Change the bandage every day. Keep the area clean and dry. Other instructions    If you breastfeed your baby, you may be more comfortable while you are healing if you don't rest your baby on your belly. Try tucking your baby under your arm, with your baby's body along the side you will be feeding on. Support your baby's upper body with your arm. With that hand you can control your baby's head to bring your baby's mouth to your breast. This is sometimes called the football hold. Follow-up care is a key part of your treatment and safety. Be sure to make and go to all appointments, and call your doctor if you are having problems. It's also a good idea to know your test results and keep a list of the medicines you take. When should you call for help? Share this information with your partner, family, or a friend. They can help you watch for warning signs. Call 911  anytime you think you may need emergency care. For example, call if:    You have thoughts of harming yourself, your baby, or another person. You passed out (lost consciousness). You have chest pain, are short of breath, or cough up blood. You have a seizure. Call your doctor now or seek immediate medical care if:    You have loose stitches, or your incision comes open. You have signs of hemorrhage (too much bleeding), such as:  Heavy vaginal bleeding. This means that you are soaking through one or more pads in an hour. Or you pass blood clots bigger than an egg. Feeling dizzy or lightheaded, or you feel like you may faint. Feeling so tired or weak that you cannot do your usual activities. A fast or irregular heartbeat. New or worse belly pain. You have symptoms of infection, such as: Increased pain, swelling, warmth, or redness. Red streaks leading from the incision. Pus draining from the incision. A fever. Vaginal discharge that smells bad. New or worse belly pain. You have symptoms of a blood clot in your leg (called a deep vein thrombosis), such as:  Pain in your calf, back of the knee, thigh, or groin. Redness and swelling in your leg or groin. You have signs of preeclampsia, such as:  Sudden swelling of your face, hands, or feet. New vision problems (such as dimness, blurring, or seeing spots). A severe headache. Watch closely for changes in your health, and be sure to contact your doctor if:    Your vaginal bleeding isn't decreasing. You feel sad, anxious, or hopeless for more than a few days. You are having problems with your breasts or breastfeeding. Where can you learn more? Go to http://www.martinez.com/  Enter M806 in the search box to learn more about \" Section: What to Expect at Home. \"  Current as of: 2022               Content Version: 13.4  © 6627-7309 [employer]. Care instructions adapted under license by [employer] (which disclaims liability or warranty for this information).  If you have questions about a medical condition or this instruction, always ask your healthcare professional. Juan Carlos Bray any warranty or liability for your use of this information.

## 2022-12-09 NOTE — PROGRESS NOTES
Progress Note    Patient: Gloria Colby MRN: [id number]  SSN: [id number]    YOB: 1985  Age: 40 y.o. Sex: female      Admit Date: 2022    LOS: 2 days     Subjective:     Patient is without complaints, she reports minimal lochia, she is tolerating a regular diet    Objective:     Vitals:    22 0801 22 1605 22 2004 22 2336   BP: 102/60 114/72 103/62 115/79   Pulse: 68 96 83 (!) 104   Resp: 18 16 18 18   Temp: 97.6 °F (36.4 °C) 98 °F (36.7 °C) 98.7 °F (37.1 °C) 98.4 °F (36.9 °C)   SpO2: 98% 100% 98% 98%   Weight:       Height:            Physical Exam:   Heart is with regular rate and rhythm  Lungs are clear  Fundus is below umbilicus  Wound is clean dry and intact  Extremities without clubbing cyanosis or edema    Lab/Data Review:  No new lab    Assessment:     Principal Problem:    Status post repeat low transverse  section (2022)    Active Problems:    History of  section (2022)    Postoperative day #2 status post repeat low transverse  section, stable.     Plan:     Discharge to home today    Signed By: Conchita Fowler MD     2022

## 2023-01-10 ENCOUNTER — OFFICE VISIT (OUTPATIENT)
Dept: OBGYN CLINIC | Age: 38
End: 2023-01-10
Payer: COMMERCIAL

## 2023-01-10 VITALS
SYSTOLIC BLOOD PRESSURE: 137 MMHG | BODY MASS INDEX: 24.45 KG/M2 | WEIGHT: 138 LBS | DIASTOLIC BLOOD PRESSURE: 73 MMHG | HEIGHT: 63 IN

## 2023-01-10 PROCEDURE — 0503F POSTPARTUM CARE VISIT: CPT | Performed by: OBSTETRICS & GYNECOLOGY

## 2023-01-10 NOTE — PROGRESS NOTES
Neel Galvan is a 40 y.o. female who presents today for the following:  Chief Complaint   Patient presents with    Post-Partum Care     Pt presents for post partum, c/section 22, with no complaints //MKeeley         Allergies   Allergen Reactions    Amoxicillin Hives    Penicillins Hives    Sulfa (Sulfonamide Antibiotics) Hives       Current Outpatient Medications   Medication Sig    ibuprofen (MOTRIN) 800 mg tablet Take 1 Tablet by mouth every eight (8) hours as needed for Pain. docusate sodium (COLACE) 100 mg capsule Take 1 Capsule by mouth two (2) times daily as needed for Constipation for up to 60 doses. Indications: constipation    prenatal vit-iron fumarate-fa 27 mg iron- 0.8 mg tab tablet Take 1 Tablet by mouth daily. No current facility-administered medications for this visit.        Past Medical History:   Diagnosis Date    Abnormal Papanicolaou smear of cervix 4/15/2018    Found out when pregnant with first child    Hyperglycemia     with pregnancy, negative for diabetes    Migraines     Wears glasses        Past Surgical History:   Procedure Laterality Date    HX BREAST AUGMENTATION      HX  SECTION  2018    HX LEEP PROCEDURE  2019    HX WISDOM TEETH EXTRACTION         Family History   Problem Relation Age of Onset    Diabetes Mother     Elevated Lipids Father     Colon Cancer Maternal Aunt     Prostate Cancer Maternal Grandfather     Colon Cancer Paternal Grandfather     No Known Problems Brother     Other Maternal Grandmother         unknown    Heart Attack Paternal Grandmother     High Cholesterol Paternal Grandmother        Social History     Socioeconomic History    Marital status:      Spouse name: Not on file    Number of children: Not on file    Years of education: Not on file    Highest education level: Not on file   Occupational History    Not on file   Tobacco Use    Smoking status: Never    Smokeless tobacco: Never   Vaping Use    Vaping Use: Never used Substance and Sexual Activity    Alcohol use: Not Currently     Comment: occ/social    Drug use: Never    Sexual activity: Yes     Partners: Male     Birth control/protection: None   Other Topics Concern    Not on file   Social History Narrative    Not on file     Social Determinants of Health     Financial Resource Strain: Not on file   Food Insecurity: Not on file   Transportation Needs: Not on file   Physical Activity: Not on file   Stress: Not on file   Social Connections: Not on file   Intimate Partner Violence: Not on file   Housing Stability: Not on file         ROS   Review of Systems   Constitutional: Negative. HENT: Negative. Eyes: Negative. Respiratory: Negative. Cardiovascular: Negative. Gastrointestinal: Negative. Genitourinary: Negative. Musculoskeletal: Negative. Skin: Negative. Neurological: Negative. Endo/Heme/Allergies: Negative. Psychiatric/Behavioral: Negative.       /73   Ht 5' 3\" (1.6 m)   Wt 138 lb (62.6 kg)   Breastfeeding Yes   BMI 24.45 kg/m²    OBGyn Exam   Constitutional  Appearance: well-nourished, well developed, alert, in no acute distress    HENT  Head and Face: appears normal    Chest  Respiratory Effort: breathing labored    Gastrointestinal  Abdominal Examination: abdomen non-tender to palpation, normal bowel sounds, no masses present    Genitourinary  External Genitalia: normal appearance for age, no discharge present, no tenderness present, no inflammatory lesions present, no masses present, no atrophy present  Vagina: normal vaginal vault without central or paravaginal defects, no discharge present, no inflammatory lesions present, no masses present  Bladder: non-tender to palpation  Urethra: appears normal  Cervix: normal   Uterus: normal size, shape and consistency  Adnexa: no adnexal tenderness present, no adnexal masses present  Perineum: perineum within normal limits, no evidence of trauma, no rashes or skin lesions present  Anus: anus within normal limits, no hemorrhoids present  Inguinal Lymph Nodes: no lymphadenopathy present    Skin  General Inspection: no rash, no lesions identified    Neurologic/Psychiatric  Mental Status:  Orientation: grossly oriented to person, place and time  Mood and Affect: mood normal, affect appropriate    No results found for this visit on 01/10/23. No orders of the defined types were placed in this encounter. 39 YO PPV  C/S  DOING WELL    1.  Routine postpartum follow-up  - DESIRE PARAGARD  - WILL REFER

## 2023-05-26 RX ORDER — PSEUDOEPHEDRINE HCL 30 MG
100 TABLET ORAL 2 TIMES DAILY PRN
COMMUNITY
Start: 2022-12-07

## 2023-05-26 RX ORDER — IBUPROFEN 800 MG/1
800 TABLET ORAL EVERY 8 HOURS PRN
COMMUNITY
Start: 2022-12-07

## (undated) DEVICE — SOL IRR SOD CL 0.9% 1000ML BTL --

## (undated) DEVICE — STERILE POLYISOPRENE POWDER-FREE SURGICAL GLOVES WITH EMOLLIENT COATING: Brand: PROTEXIS

## (undated) DEVICE — STERILE LATEX POWDER-FREE SURGICAL GLOVESWITH NITRILE AND EMOLLIENT COATINGS: Brand: PROTEXIS

## (undated) DEVICE — APPLICATOR SCRB 26ML TEAL STRL -- CHLORAPREP 26ML

## (undated) DEVICE — GAUZE,SPONGE,4"X4",16PLY,XRAY,STRL,LF: Brand: MEDLINE

## (undated) DEVICE — HANDLE SURG LIGHT OB

## (undated) DEVICE — SUT VCRL + 2-0 36IN CT1 UD --

## (undated) DEVICE — BAG,SPONGE COUNTER,CLEAR,50/BX,5BX/CS: Brand: MEDLINE

## (undated) DEVICE — SUTURE VCRL + SZ 3-0 L36IN ABSRB UD L36MM CT-1 1/2 CIR VCP944H

## (undated) DEVICE — GARMENT CMPR STD UNV CALF FLWT -- RN VENTILATE NS DISP 17- IN

## (undated) DEVICE — C-SECTION: Brand: MEDLINE INDUSTRIES, INC.

## (undated) DEVICE — DRAPE C-SECTION W/WINDOW --

## (undated) DEVICE — SUTURE ABSORBABLE BRAIDED 0 CT 36 IN DA UD VICRYL VCP958H

## (undated) DEVICE — SUTURE MCRYL SZ 3-0 L27IN ABSRB UD L60MM KS STR REV CUT Y523H

## (undated) DEVICE — TRAY URIN CATH PED 16FR BLLN 5CC INDWL STR TIP INF CTRL

## (undated) DEVICE — REM POLYHESIVE ADULT PATIENT RETURN ELECTRODE: Brand: VALLEYLAB